# Patient Record
Sex: FEMALE | Race: WHITE | NOT HISPANIC OR LATINO | Employment: FULL TIME | URBAN - METROPOLITAN AREA
[De-identification: names, ages, dates, MRNs, and addresses within clinical notes are randomized per-mention and may not be internally consistent; named-entity substitution may affect disease eponyms.]

---

## 2023-08-31 PROBLEM — R51.9 WORSENING HEADACHES: Status: ACTIVE | Noted: 2023-08-31

## 2023-08-31 PROBLEM — K21.9 GERD (GASTROESOPHAGEAL REFLUX DISEASE): Status: ACTIVE | Noted: 2023-08-31

## 2023-08-31 PROBLEM — R20.0 BILATERAL NUMBNESS AND TINGLING OF ARMS AND LEGS: Status: ACTIVE | Noted: 2023-08-31

## 2023-08-31 PROBLEM — E53.8 VITAMIN B12 DEFICIENCY: Status: ACTIVE | Noted: 2023-08-31

## 2023-08-31 PROBLEM — H91.90 HEARING LOSS: Status: ACTIVE | Noted: 2023-08-31

## 2023-08-31 PROBLEM — N92.6 MISSED MENSES: Status: ACTIVE | Noted: 2023-08-31

## 2023-08-31 PROBLEM — R63.5 WEIGHT GAIN: Status: ACTIVE | Noted: 2023-08-31

## 2023-08-31 PROBLEM — L68.0 HIRSUTISM: Status: ACTIVE | Noted: 2023-08-31

## 2023-08-31 PROBLEM — R20.2 BILATERAL NUMBNESS AND TINGLING OF ARMS AND LEGS: Status: ACTIVE | Noted: 2023-08-31

## 2023-08-31 PROBLEM — M35.7 HYPERMOBILITY SYNDROME: Status: ACTIVE | Noted: 2023-08-31

## 2023-08-31 PROBLEM — G43.719 INTRACTABLE CHRONIC MIGRAINE WITHOUT AURA AND WITHOUT STATUS MIGRAINOSUS: Status: ACTIVE | Noted: 2023-08-31

## 2023-08-31 PROBLEM — M25.561 RIGHT KNEE PAIN: Status: ACTIVE | Noted: 2023-08-31

## 2023-08-31 PROBLEM — F32.2 CURRENT SEVERE EPISODE OF MAJOR DEPRESSIVE DISORDER WITHOUT PSYCHOTIC FEATURES WITHOUT PRIOR EPISODE (MULTI): Status: ACTIVE | Noted: 2023-08-31

## 2023-08-31 PROBLEM — H54.7 VISION LOSS: Status: ACTIVE | Noted: 2023-08-31

## 2023-08-31 PROBLEM — G43.119 INTRACTABLE MIGRAINE WITH AURA WITHOUT STATUS MIGRAINOSUS: Status: ACTIVE | Noted: 2023-08-31

## 2023-08-31 PROBLEM — R10.2 PELVIC PAIN IN FEMALE: Status: ACTIVE | Noted: 2023-08-31

## 2023-08-31 PROBLEM — F41.9 SEVERE ANXIETY: Status: ACTIVE | Noted: 2023-08-31

## 2023-08-31 PROBLEM — N89.8 VAGINAL DISCHARGE: Status: ACTIVE | Noted: 2023-08-31

## 2023-08-31 PROBLEM — R68.89 HEAT INTOLERANCE: Status: ACTIVE | Noted: 2023-08-31

## 2023-08-31 PROBLEM — G47.00 INSOMNIA: Status: ACTIVE | Noted: 2023-08-31

## 2023-08-31 RX ORDER — FREMANEZUMAB-VFRM 225 MG/1.5ML
225 INJECTION SUBCUTANEOUS
COMMUNITY
Start: 2022-10-03 | End: 2023-11-13 | Stop reason: SINTOL

## 2023-08-31 RX ORDER — ALUMINUM CHLORIDE 20 %
SOLUTION, NON-ORAL TOPICAL
COMMUNITY
Start: 2023-04-03 | End: 2024-03-04 | Stop reason: WASHOUT

## 2023-08-31 RX ORDER — ELETRIPTAN HYDROBROMIDE 40 MG/1
TABLET, FILM COATED ORAL
COMMUNITY
Start: 2014-09-09 | End: 2024-03-04 | Stop reason: WASHOUT

## 2023-08-31 RX ORDER — ESCITALOPRAM OXALATE 20 MG/1
20 TABLET ORAL DAILY
COMMUNITY
Start: 2014-10-07 | End: 2024-03-04 | Stop reason: WASHOUT

## 2023-08-31 RX ORDER — ACETAMINOPHEN 325 MG/1
975 TABLET ORAL EVERY 6 HOURS PRN
COMMUNITY
Start: 2020-07-04

## 2023-08-31 RX ORDER — RIZATRIPTAN BENZOATE 10 MG/1
TABLET ORAL
COMMUNITY
Start: 2014-09-12 | End: 2024-03-04 | Stop reason: WASHOUT

## 2023-08-31 RX ORDER — IBUPROFEN 600 MG/1
600 TABLET ORAL EVERY 6 HOURS PRN
COMMUNITY
Start: 2020-07-04

## 2023-08-31 RX ORDER — HYDROXYZINE PAMOATE 25 MG/1
25 CAPSULE ORAL 3 TIMES DAILY PRN
COMMUNITY
Start: 2023-07-31 | End: 2024-03-04 | Stop reason: WASHOUT

## 2023-08-31 RX ORDER — UBROGEPANT 50 MG/1
TABLET ORAL
COMMUNITY
Start: 2022-01-13 | End: 2024-01-02 | Stop reason: SDUPTHER

## 2023-09-04 PROBLEM — E66.811 CLASS 1 OBESITY WITH ALVEOLAR HYPOVENTILATION AND BODY MASS INDEX (BMI) OF 32.0 TO 32.9 IN ADULT: Status: ACTIVE | Noted: 2023-09-04

## 2023-09-04 PROBLEM — E66.2 CLASS 1 OBESITY WITH ALVEOLAR HYPOVENTILATION AND BODY MASS INDEX (BMI) OF 32.0 TO 32.9 IN ADULT (MULTI): Status: ACTIVE | Noted: 2023-09-04

## 2023-09-04 RX ORDER — DULOXETIN HYDROCHLORIDE 30 MG/1
30 CAPSULE, DELAYED RELEASE ORAL DAILY
COMMUNITY
End: 2024-03-04 | Stop reason: WASHOUT

## 2023-09-04 RX ORDER — DULOXETIN HYDROCHLORIDE 60 MG/1
60 CAPSULE, DELAYED RELEASE ORAL DAILY
COMMUNITY
End: 2024-03-04 | Stop reason: WASHOUT

## 2023-11-13 ENCOUNTER — OFFICE VISIT (OUTPATIENT)
Dept: NEUROLOGY | Facility: CLINIC | Age: 28
End: 2023-11-13
Payer: COMMERCIAL

## 2023-11-13 VITALS
BODY MASS INDEX: 33.72 KG/M2 | SYSTOLIC BLOOD PRESSURE: 106 MMHG | HEART RATE: 89 BPM | WEIGHT: 202.4 LBS | RESPIRATION RATE: 16 BRPM | TEMPERATURE: 97.8 F | HEIGHT: 65 IN | DIASTOLIC BLOOD PRESSURE: 70 MMHG

## 2023-11-13 DIAGNOSIS — G43.719 INTRACTABLE CHRONIC MIGRAINE WITHOUT AURA AND WITHOUT STATUS MIGRAINOSUS: Primary | ICD-10-CM

## 2023-11-13 PROCEDURE — 1036F TOBACCO NON-USER: CPT | Performed by: PSYCHIATRY & NEUROLOGY

## 2023-11-13 PROCEDURE — 99214 OFFICE O/P EST MOD 30 MIN: CPT | Performed by: PSYCHIATRY & NEUROLOGY

## 2023-11-13 RX ORDER — ATOGEPANT 60 MG/1
60 TABLET ORAL DAILY
Qty: 30 TABLET | Refills: 3 | Status: SHIPPED | OUTPATIENT
Start: 2023-11-13 | End: 2024-04-26 | Stop reason: SDUPTHER

## 2023-11-13 RX ORDER — LINACLOTIDE 72 UG/1
CAPSULE, GELATIN COATED ORAL
COMMUNITY
Start: 2023-11-10

## 2023-11-13 ASSESSMENT — ENCOUNTER SYMPTOMS
HALLUCINATIONS: 0
FEVER: 0
EYE PAIN: 0
HEADACHES: 0

## 2023-11-13 NOTE — PROGRESS NOTES
Subjective     Janeen Jiméenz is a 28 y.o. year old female here for follow-up of migraines.     Migraine   Pertinent negatives include no ear pain, eye pain or fever.   She was upset she missed her last appointment with me.   Ajovy injections cause bubbly redness for 1-2 days, does not bother her much.  Was feeling burning when she   Ubrelvy has helped the migraine by stopping her migraine when she gets them which she is very happy about.  Ajovy helped a lot in the first year but seems to be not lasting as long as it used to.  Needs Ubrelvy more often.     A few headaches /week as the end of the month approaces.   MRI brain 1-10-22 showed tonsillar ptosis. otherwise unremarkable.  EMG 1-5-22- WNL     Onset of headaches: ever since she remembers  Frequency of headaches (days per month): 10-12 days/ month  Duration of headaches (average): few hours   Severity of headaches (out of 10): 6-7 /10  Aura: visual changes/ colorful floaters. things go black. vision changes for 20 minutes.   Nausea/vomiting: +  Photophobia: +  Phonophobia: +     Location: occipital area, and frontal . pressure like.      stress triggers HAs. hormonal changes as well trigger HAs.      Preventative medications:  Zonisamide - did not help   Topamax- not effective/ caused tingling.   Propranolol 60mg ER- currently on it. BP are slightly low. dizzy.   b complex   Ajovy- currently taking now.      Abortive medications:  Rizatriptan- jaw went numb  Sumatriptan- caused tiredness/ nausea.   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      Personally reviewed with patient- accurate for below - Past Medical, Surgical and Family and Social histories    Last visit was February.  Ajovy has helped her a lot.  Review of Systems   Constitutional:  Negative for fever.   HENT:  Negative for ear pain.    Eyes:  Negative for pain.   Neurological:  Negative for syncope and headaches.         Headache+    Psychiatric/Behavioral:  Negative for hallucinations and self-injury.    All other systems reviewed and are negative.      Patient Active Problem List   Diagnosis    Bilateral numbness and tingling of arms and legs    Current severe episode of major depressive disorder without psychotic features without prior episode (CMS/[facility])    Generalized anxiety disorder    GERD (gastroesophageal reflux disease)    Hearing loss    Heat intolerance    Hirsutism    Hypermobility syndrome    Insomnia    Intractable chronic migraine without aura and without status migrainosus    Intractable migraine with aura without status migrainosus    Migraine    Missed menses    Pelvic pain in female    Right knee pain    Severe anxiety    Vaginal discharge    Vision loss    Vitamin B12 deficiency    Weight gain    Worsening headaches    Class 1 obesity with alveolar hypoventilation and body mass index (BMI) of 32.0 to 32.9 in adult (CMS/[facility])     Past Medical History:   Diagnosis Date    16 weeks gestation of pregnancy 2020    16 weeks gestation of pregnancy    Acute cystitis without hematuria 2020    Acute cystitis without hematuria    Acute vaginitis 2020    Bacterial vaginosis    Encounter for insertion of intrauterine contraceptive device 2020    Encounter for insertion of intrauterine contraceptive device (IUD)    Encounter for other specified  screening 2020    Encounter for fetal anatomic survey    Encounter for routine checking of intrauterine contraceptive device 10/22/2020    Encounter for routine checking of intrauterine contraceptive device (IUD)    Encounter for routine postpartum follow-up 2020    Postpartum care following vaginal delivery    Gestational diabetes mellitus in childbirth, unspecified control 10/28/2020    Gestational diabetes mellitus (GDM), delivered    Impacted cerumen, right ear 2021    Impacted cerumen of right ear    Less than 8 weeks gestation of  pregnancy 11/14/2019    Less than 8 weeks gestation of pregnancy    Other conditions influencing health status 03/05/2020    History of pregnancy    Other sites of candidiasis 07/29/2020    Candidiasis of breast    Pain in right ankle and joints of right foot 08/10/2018    Right ankle pain    Personal history of gestational diabetes     History of gestational diabetes mellitus (GDM)    Personal history of other diseases of the female genital tract 08/18/2020    History of amenorrhea    Personal history of other diseases of the musculoskeletal system and connective tissue 10/28/2020    History of low back pain    Personal history of other diseases of the nervous system and sense organs 04/16/2017    History of otitis media    Personal history of other diseases of the nervous system and sense organs 01/14/2021    History of ear pain    Personal history of other diseases of the nervous system and sense organs 04/02/2021    History of acute otitis media    Personal history of other diseases of the respiratory system 04/11/2017    History of acute pharyngitis    Personal history of other diseases of the respiratory system     History of sore throat    Personal history of other diseases of the respiratory system 04/16/2017    History of acute sinusitis    Personal history of other specified conditions 03/05/2020    History of nausea and vomiting    Postpartum depression 08/25/2020    Mild postpartum depression    Spotting complicating pregnancy, first trimester 12/05/2019    Spotting during pregnancy in first trimester    Sprain of unspecified ligament of right ankle, initial encounter 08/10/2018    Right ankle sprain     Past Surgical History:   Procedure Laterality Date    OTHER SURGICAL HISTORY  11/14/2019    No history of surgery     Social History     Tobacco Use    Smoking status: Never    Smokeless tobacco: Never   Substance Use Topics    Alcohol use: Not Currently     family history includes Diabetes in her  "father; Hyperlipidemia in her father.    Current Outpatient Medications:     aluminum chloride (Drysol Dab-O-Matic) 20 % external solution, Apply to affected area daily at bedtime., Disp: , Rfl:     fremanezumab (Ajovy Autoinjector) 225 mg/1.5 mL auto-injector, Inject 1 Pen (225 mg) under the skin every 30 (thirty) days., Disp: , Rfl:     ibuprofen 600 mg tablet, Take 1 tablet (600 mg) by mouth every 6 hours if needed for moderate pain (4 - 6)., Disp: , Rfl:     Linzess 72 mcg capsule, , Disp: , Rfl:     ubrogepant (Ubrelvy) 50 mg tablet, Take one (1) tablet by mouth Once at start of migraine headache. May repeat dose in 2 hours if needed. Do not exceed maximum of 200mg (4 tablets) per 24 hours., Disp: , Rfl:     acetaminophen (Tylenol) 325 mg tablet, Take 3 tablets (975 mg) by mouth every 6 hours if needed for mild pain (1 - 3)., Disp: , Rfl:     DULoxetine (Cymbalta) 30 mg DR capsule, Take 1 capsule (30 mg) by mouth once daily. Do not crush or chew., Disp: , Rfl:     DULoxetine (Cymbalta) 60 mg DR capsule, Take 1 capsule (60 mg) by mouth once daily. Do not crush or chew., Disp: , Rfl:     eletriptan (Relpax) 40 mg tablet, Take by mouth., Disp: , Rfl:     escitalopram (Lexapro) 20 mg tablet, Take 1 tablet (20 mg) by mouth once daily., Disp: , Rfl:     hydrOXYzine pamoate (Vistaril) 25 mg capsule, Take 1 capsule (25 mg) by mouth 3 times a day as needed for anxiety., Disp: , Rfl:     rizatriptan (Maxalt) 10 mg tablet, , Disp: , Rfl:   Allergies   Allergen Reactions    Topiramate Other     /70 (BP Location: Right arm)   Pulse 89   Temp 36.6 °C (97.8 °F)   Resp 16   Ht 1.651 m (5' 5\")   Wt 91.8 kg (202 lb 6.4 oz)   BMI 33.68 kg/m²   Neurological Exam/Physical Exam:    Constitutional: General appearance: no acute distress. Pleasant.   Auscultation of Heart: Regular rate and rhythm, no murmurs, normal S1 and S2.   Carotid Arteries: no bruits  Peripheral Vascular Exam: No swelling, edema or tenderness to " palpation in extremities  Mental status: no distress, alert, interactive and cooperative. Affect is appropriate.   Orientation: oriented to person, oriented to place and oriented to time.   Memory: recent memory intact and remote memory intact.   Attention: normal attention /concentration.   Language: normal comprehension and naming.   Fund of knowledge: Patient displays adequate knowledge of current events.  Eyes: The ophthalmoscopic examination was normal.   Cranial nerve II: Visual fields full to confrontation.   Cranial nerves III, IV, and VI: Pupils round, equally reactive to light; EOMs intact. No nystagmus.   Cranial Nerve V: Facial sensation intact to LT bilaterally.   Cranial nerve VII: no facial droop  Cranial nerve VIII: Hearing is intact  Cranial nerves IX and X: Palate elevates symmetrically.   Cranial nerve XI: Shoulder shrug intact.   Cranial nerve XII: Tongue is midline.  Motor:  Muscle bulk was normal in both upper and lower extremities.    No atrophy.   Strength is normal   Deep Tendon Reflexes: left biceps 2+ , right biceps 2+, left triceps 2+, right triceps 2+, left brachioradialis 2+, right brachioradialis 2+, left patella 2+, right patella 2+, left ankle jerk 2+, right ankle jerk 2+   Plantar Reflex: Toes downgoing to plantar stimulation on the left. Toes downgoing to plantar stimulation on the right.   Sensory Exam: Normal to light touch.   Coordination:  no limb dystaxia and rapid alternating movements are intact.   Gait:  cautious.         Labs:  CBC:   Lab Results   Component Value Date    WBC 9.4 03/10/2022    HGB 14.0 03/10/2022    HCT 41.9 03/10/2022     03/10/2022     BMP:   Lab Results   Component Value Date     10/18/2021    K 3.9 10/18/2021     10/18/2021    CO2 28 10/18/2021    BUN 12 10/18/2021    CREATININE 0.69 10/18/2021    CALCIUM 9.0 10/18/2021     LFT:   Lab Results   Component Value Date    ALKPHOS 88 10/18/2021    BILITOT 0.6 10/18/2021    PROT 7.5  10/18/2021    ALBUMIN 4.2 10/18/2021    ALT 15 10/18/2021    AST 18 10/18/2021         Assessment/Plan   Problem List Items Addressed This Visit             ICD-10-CM    Intractable chronic migraine without aura and without status migrainosus - Primary G43.719    Switch ajovy to qulipta due to injection site reaction/ not lasting long enough, wearing off effect.

## 2023-11-13 NOTE — PATIENT INSTRUCTIONS
"It was a pleasure seeing you today.     Please make a follow up appointment in 4-6 months.    For any urgent issues or needing to speak to a medical assistant please call [phone], option 6 during our office hours Monday-Friday 8am-4pm, and leave a voicemail with your concern.  My office will try to reach back you as soon as possible within 24 (business) hours.  If you have an emergency please call 911 or visit a local urgent care or nearest emergency room.      Please understand that Seevibes is a useful communication tool for simple \"normal\" results or a refill request but I would not recommend using this tool for emergent or urgent issues or for conversations with me.  I am happy to ask my staff to rearrange a follow up visit or a virtual visit sooner than requested if appropriate for your care.    "

## 2024-01-02 DIAGNOSIS — G43.719 INTRACTABLE CHRONIC MIGRAINE WITHOUT AURA AND WITHOUT STATUS MIGRAINOSUS: Primary | ICD-10-CM

## 2024-01-02 RX ORDER — UBROGEPANT 50 MG/1
100 TABLET ORAL AS NEEDED
Qty: 16 TABLET | Refills: 3 | Status: SHIPPED | OUTPATIENT
Start: 2024-01-02 | End: 2024-01-26

## 2024-01-08 DIAGNOSIS — M25.561 RIGHT KNEE PAIN, UNSPECIFIED CHRONICITY: ICD-10-CM

## 2024-01-10 ENCOUNTER — APPOINTMENT (OUTPATIENT)
Dept: RADIOLOGY | Facility: HOSPITAL | Age: 29
End: 2024-01-10
Payer: COMMERCIAL

## 2024-01-10 ENCOUNTER — OFFICE VISIT (OUTPATIENT)
Dept: ORTHOPEDIC SURGERY | Facility: HOSPITAL | Age: 29
End: 2024-01-10
Payer: COMMERCIAL

## 2024-01-10 DIAGNOSIS — M25.561 PATELLOFEMORAL JOINT PAIN, RIGHT: Primary | ICD-10-CM

## 2024-01-10 PROCEDURE — 99213 OFFICE O/P EST LOW 20 MIN: CPT | Performed by: ORTHOPAEDIC SURGERY

## 2024-01-10 PROCEDURE — 1036F TOBACCO NON-USER: CPT | Performed by: ORTHOPAEDIC SURGERY

## 2024-01-10 PROCEDURE — 99203 OFFICE O/P NEW LOW 30 MIN: CPT | Performed by: ORTHOPAEDIC SURGERY

## 2024-01-10 RX ORDER — FLUTICASONE PROPIONATE 50 MCG
2 SPRAY, SUSPENSION (ML) NASAL DAILY
COMMUNITY

## 2024-01-10 RX ORDER — CETIRIZINE HYDROCHLORIDE 10 MG/1
10 TABLET ORAL DAILY
COMMUNITY
Start: 2023-06-12

## 2024-01-10 RX ORDER — TRAZODONE HYDROCHLORIDE 50 MG/1
TABLET ORAL
COMMUNITY
Start: 2024-01-04 | End: 2024-03-04 | Stop reason: WASHOUT

## 2024-01-10 RX ORDER — MELOXICAM 15 MG/1
TABLET ORAL
COMMUNITY
Start: 2024-01-02

## 2024-01-10 RX ORDER — AZELASTINE 1 MG/ML
2 SPRAY, METERED NASAL 2 TIMES DAILY
COMMUNITY
Start: 2023-12-08

## 2024-01-10 RX ORDER — LORAZEPAM 0.5 MG/1
TABLET ORAL
COMMUNITY
Start: 2020-10-28

## 2024-01-10 ASSESSMENT — PAIN SCALES - GENERAL: PAINLEVEL_OUTOF10: 4

## 2024-01-10 ASSESSMENT — PAIN - FUNCTIONAL ASSESSMENT: PAIN_FUNCTIONAL_ASSESSMENT: 0-10

## 2024-01-10 ASSESSMENT — PAIN DESCRIPTION - DESCRIPTORS: DESCRIPTORS: ACHING;DULL;THROBBING

## 2024-01-10 NOTE — PROGRESS NOTES
HPI  This is a pleasant 28 y.o. female here today for further evaluation of right  knee pain.  The pain started 2 years ago when she felt a pop while riding her peloton. Denies a significant effusion at the time.   It is worse with with stairs and sitting for prolonged time .  It is improved with rest.  They do not feel there has been swelling.  They have had treatment including PT, NSAIDs, and advil .  They are here today for further evaluation.     Past Medical History:   Diagnosis Date    16 weeks gestation of pregnancy 2020    16 weeks gestation of pregnancy    Acute cystitis without hematuria 2020    Acute cystitis without hematuria    Acute vaginitis 2020    Bacterial vaginosis    Encounter for insertion of intrauterine contraceptive device 2020    Encounter for insertion of intrauterine contraceptive device (IUD)    Encounter for other specified  screening 2020    Encounter for fetal anatomic survey    Encounter for routine checking of intrauterine contraceptive device 10/22/2020    Encounter for routine checking of intrauterine contraceptive device (IUD)    Encounter for routine postpartum follow-up 2020    Postpartum care following vaginal delivery    Gestational diabetes mellitus in childbirth, unspecified control 10/28/2020    Gestational diabetes mellitus (GDM), delivered    Impacted cerumen, right ear 2021    Impacted cerumen of right ear    Less than 8 weeks gestation of pregnancy 2019    Less than 8 weeks gestation of pregnancy    Other conditions influencing health status 2020    History of pregnancy    Other sites of candidiasis 2020    Candidiasis of breast    Pain in right ankle and joints of right foot 08/10/2018    Right ankle pain    Personal history of gestational diabetes     History of gestational diabetes mellitus (GDM)    Personal history of other diseases of the female genital tract 2020    History of amenorrhea     Personal history of other diseases of the musculoskeletal system and connective tissue 10/28/2020    History of low back pain    Personal history of other diseases of the nervous system and sense organs 04/16/2017    History of otitis media    Personal history of other diseases of the nervous system and sense organs 01/14/2021    History of ear pain    Personal history of other diseases of the nervous system and sense organs 04/02/2021    History of acute otitis media    Personal history of other diseases of the respiratory system 04/11/2017    History of acute pharyngitis    Personal history of other diseases of the respiratory system     History of sore throat    Personal history of other diseases of the respiratory system 04/16/2017    History of acute sinusitis    Personal history of other specified conditions 03/05/2020    History of nausea and vomiting    Postpartum depression 08/25/2020    Mild postpartum depression    Spotting complicating pregnancy, first trimester 12/05/2019    Spotting during pregnancy in first trimester    Sprain of unspecified ligament of right ankle, initial encounter 08/10/2018    Right ankle sprain       Past Surgical History:   Procedure Laterality Date    OTHER SURGICAL HISTORY  11/14/2019    No history of surgery       Social History     Socioeconomic History    Marital status:      Spouse name: Not on file    Number of children: Not on file    Years of education: Not on file    Highest education level: Not on file   Occupational History    Not on file   Tobacco Use    Smoking status: Never     Passive exposure: Never    Smokeless tobacco: Never   Vaping Use    Vaping Use: Never used   Substance and Sexual Activity    Alcohol use: Not Currently     Comment: 1-2 drinks per year    Drug use: Never    Sexual activity: Defer   Other Topics Concern    Not on file   Social History Narrative    Not on file     Social Determinants of Health     Financial Resource Strain: Not on  file   Food Insecurity: Not on file   Transportation Needs: Not on file   Physical Activity: Not on file   Stress: Not on file   Social Connections: Not on file   Intimate Partner Violence: Not on file   Housing Stability: Not on file           ROS  Reviewed and all pertinent positives mentioned in HPI.      EXAM  Patient is in no acute distress.  They are alert and oriented x3.  They are of normal mood and affect.  They are in no acute distress.  The patient's limb is warm and well-perfused.  They have intact sensation to light touch in all lower extremity dermatomes.  There is a minimal effusion.    The patient's quadriceps and hamstring strength is 5 of 5.    The patient can do a straight leg raise with no radicular pain.  negative lachmans. negative posterior drawer.  There is 1+ patellofemoral crepitus.   The knee is stable to varus and valgus stress at both 0 and 30°.  There is no tenderness along the medial or lateral joint line.  negative McMurrays      IMAGING  Imaging reviewed today reveal no gross fracture or dislocation.  Preserved joint spaces.       ASSESSMENT  right  patella chondromalacia      PLAN  We discussed the diagnosis and natural history of patellar chondromalacia. We recommend conservative treatment in the form of focused PT and Meloxim x7 days. If the pain does not improve with this regimen she will follow-up for further evaluation.

## 2024-01-25 DIAGNOSIS — G43.719 INTRACTABLE CHRONIC MIGRAINE WITHOUT AURA AND WITHOUT STATUS MIGRAINOSUS: ICD-10-CM

## 2024-01-26 RX ORDER — UBROGEPANT 50 MG/1
TABLET ORAL
Qty: 10 TABLET | Refills: 6 | Status: SHIPPED | OUTPATIENT
Start: 2024-01-26 | End: 2024-04-26 | Stop reason: SDUPTHER

## 2024-03-04 ENCOUNTER — OFFICE VISIT (OUTPATIENT)
Dept: NEUROLOGY | Facility: CLINIC | Age: 29
End: 2024-03-04
Payer: COMMERCIAL

## 2024-03-04 VITALS
HEART RATE: 90 BPM | WEIGHT: 192 LBS | SYSTOLIC BLOOD PRESSURE: 119 MMHG | DIASTOLIC BLOOD PRESSURE: 90 MMHG | BODY MASS INDEX: 31.95 KG/M2

## 2024-03-04 DIAGNOSIS — G43.719 INTRACTABLE CHRONIC MIGRAINE WITHOUT AURA AND WITHOUT STATUS MIGRAINOSUS: Primary | ICD-10-CM

## 2024-03-04 PROCEDURE — 1036F TOBACCO NON-USER: CPT | Performed by: PSYCHIATRY & NEUROLOGY

## 2024-03-04 PROCEDURE — 99214 OFFICE O/P EST MOD 30 MIN: CPT | Performed by: PSYCHIATRY & NEUROLOGY

## 2024-03-04 RX ORDER — VENLAFAXINE 37.5 MG/1
37.5 TABLET ORAL 2 TIMES DAILY
COMMUNITY
End: 2024-03-04 | Stop reason: WASHOUT

## 2024-03-04 ASSESSMENT — ENCOUNTER SYMPTOMS
HALLUCINATIONS: 0
EYE PAIN: 0
FEVER: 0
HEADACHES: 0

## 2024-03-04 NOTE — PROGRESS NOTES
Subjective     Janeen Jiménez is a 28 y.o. year old female here for follow-up of migraines.     Ajovy injections cause bubbly redness so it was switched last viist to Qulipta 60mg daily in November.  Less intense headaches, happens every other 1-2 weeks.  Take Ubrelvy and it goes away. She finds these help her migraines a lot.   Same type of headaches, starts in the back of neck and radiates up to the neck.   She drinks water.   She gets 12 tabs of Ubrelvy per month.    Ubrelvy has helped the migraine by stopping her migraine when she gets them which she is very happy about.  MRI brain 1-10-22 showed tonsillar ptosis. otherwise unremarkable.  EMG 1-5-22- WNL     Onset of headaches: ever since she remembers  Frequency of headaches (days per month): 6-8 days/ month  Duration of headaches (average): few hours   Severity of headaches (out of 10): 6-7 /10  Aura: visual changes/ colorful floaters. things go black. vision changes for 20 minutes.   Nausea/vomiting: +  Photophobia: +  Phonophobia: +     Location: occipital area, and frontal . pressure like.      stress triggers HAs. hormonal changes as well trigger HAs.      Preventative medications:  Zonisamide - did not help   Topamax- not effective/ caused tingling.   Propranolol 60mg ER- currently on it. BP are slightly low. dizzy.   b complex   Ajovy- currently taking now.      Abortive medications:  Rizatriptan- jaw went numb  Sumatriptan- caused tiredness/ nausea.   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      Personally reviewed with patient- accurate for below - Past Medical, Surgical and Family and Social histories    Last visit was February.  Ajovy has helped her a lot.  Review of Systems   Constitutional:  Negative for fever.   HENT:  Negative for ear pain.    Eyes:  Negative for pain.   Neurological:  Negative for syncope and headaches.        Headache+    Psychiatric/Behavioral:   Negative for hallucinations and self-injury.    All other systems reviewed and are negative.      Patient Active Problem List   Diagnosis    Bilateral numbness and tingling of arms and legs    Current severe episode of major depressive disorder without psychotic features without prior episode (CMS/[facility])    Generalized anxiety disorder    GERD (gastroesophageal reflux disease)    Hearing loss    Heat intolerance    Hirsutism    Hypermobility syndrome    Insomnia    Intractable chronic migraine without aura and without status migrainosus    Intractable migraine with aura without status migrainosus    Migraine    Missed menses    Pelvic pain in female    Right knee pain    Severe anxiety    Vaginal discharge    Vision loss    Vitamin B12 deficiency    Weight gain    Worsening headaches    Class 1 obesity with alveolar hypoventilation and body mass index (BMI) of 32.0 to 32.9 in adult (CMS/[facility])     Past Medical History:   Diagnosis Date    16 weeks gestation of pregnancy 2020    16 weeks gestation of pregnancy    Acute cystitis without hematuria 2020    Acute cystitis without hematuria    Acute vaginitis 2020    Bacterial vaginosis    Encounter for insertion of intrauterine contraceptive device 2020    Encounter for insertion of intrauterine contraceptive device (IUD)    Encounter for other specified  screening 2020    Encounter for fetal anatomic survey    Encounter for routine checking of intrauterine contraceptive device 10/22/2020    Encounter for routine checking of intrauterine contraceptive device (IUD)    Encounter for routine postpartum follow-up 2020    Postpartum care following vaginal delivery    Gestational diabetes mellitus in childbirth, unspecified control 10/28/2020    Gestational diabetes mellitus (GDM), delivered    Impacted cerumen, right ear 2021    Impacted cerumen of right ear    Less than 8 weeks gestation of pregnancy 2019    Less than 8  weeks gestation of pregnancy    Other conditions influencing health status 03/05/2020    History of pregnancy    Other sites of candidiasis 07/29/2020    Candidiasis of breast    Pain in right ankle and joints of right foot 08/10/2018    Right ankle pain    Personal history of gestational diabetes     History of gestational diabetes mellitus (GDM)    Personal history of other diseases of the female genital tract 08/18/2020    History of amenorrhea    Personal history of other diseases of the musculoskeletal system and connective tissue 10/28/2020    History of low back pain    Personal history of other diseases of the nervous system and sense organs 04/16/2017    History of otitis media    Personal history of other diseases of the nervous system and sense organs 01/14/2021    History of ear pain    Personal history of other diseases of the nervous system and sense organs 04/02/2021    History of acute otitis media    Personal history of other diseases of the respiratory system 04/11/2017    History of acute pharyngitis    Personal history of other diseases of the respiratory system     History of sore throat    Personal history of other diseases of the respiratory system 04/16/2017    History of acute sinusitis    Personal history of other specified conditions 03/05/2020    History of nausea and vomiting    Postpartum depression 08/25/2020    Mild postpartum depression    Spotting complicating pregnancy, first trimester 12/05/2019    Spotting during pregnancy in first trimester    Sprain of unspecified ligament of right ankle, initial encounter 08/10/2018    Right ankle sprain     Past Surgical History:   Procedure Laterality Date    OTHER SURGICAL HISTORY  11/14/2019    No history of surgery     Social History     Tobacco Use    Smoking status: Never     Passive exposure: Never    Smokeless tobacco: Never   Substance Use Topics    Alcohol use: Not Currently     Comment: 1-2 drinks per year     family history  includes Diabetes in her father; Hyperlipidemia in her father; aunt with kidney disease and possible colon cancer in an other family member.    Current Outpatient Medications:     acetaminophen (Tylenol) 325 mg tablet, Take 3 tablets (975 mg) by mouth every 6 hours if needed for mild pain (1 - 3)., Disp: , Rfl:     aluminum chloride (Drysol Dab-O-Matic) 20 % external solution, Apply to affected area daily at bedtime., Disp: , Rfl:     atogepant (Qulipta) 60 mg tablet tablet, Take 1 tablet (60 mg) by mouth once daily., Disp: 30 tablet, Rfl: 3    azelastine (Astelin) 137 mcg (0.1 %) nasal spray, Administer 2 sprays into each nostril 2 times a day., Disp: , Rfl:     cetirizine (ZyrTEC) 10 mg tablet, Take 1 tablet (10 mg) by mouth once daily., Disp: , Rfl:     DULoxetine (Cymbalta) 30 mg DR capsule, Take 1 capsule (30 mg) by mouth once daily. Do not crush or chew., Disp: , Rfl:     DULoxetine (Cymbalta) 60 mg DR capsule, Take 1 capsule (60 mg) by mouth once daily. Do not crush or chew., Disp: , Rfl:     eletriptan (Relpax) 40 mg tablet, Take by mouth., Disp: , Rfl:     escitalopram (Lexapro) 20 mg tablet, Take 1 tablet (20 mg) by mouth once daily., Disp: , Rfl:     fluticasone (Flonase) 50 mcg/actuation nasal spray, Administer 2 sprays into each nostril once daily., Disp: , Rfl:     hydrOXYzine pamoate (Vistaril) 25 mg capsule, Take 1 capsule (25 mg) by mouth 3 times a day as needed for anxiety., Disp: , Rfl:     ibuprofen 600 mg tablet, Take 1 tablet (600 mg) by mouth every 6 hours if needed for moderate pain (4 - 6)., Disp: , Rfl:     Linzess 72 mcg capsule, , Disp: , Rfl:     LORazepam (Ativan) 0.5 mg tablet, Take by mouth., Disp: , Rfl:     meloxicam (Mobic) 15 mg tablet, , Disp: , Rfl:     rizatriptan (Maxalt) 10 mg tablet, , Disp: , Rfl:     traZODone (Desyrel) 50 mg tablet, , Disp: , Rfl:     Ubrelvy 50 mg tablet, TAKE 1 TAB AT START OF MIGRAINE HEADACHE, Disp: 10 tablet, Rfl: 6    venlafaxine (Effexor) 37.5 mg  tablet, Take 1 tablet (37.5 mg) by mouth 2 times a day., Disp: , Rfl:   Allergies   Allergen Reactions    Topiramate Other     Caused hand and feet numbness     /90   Pulse 90   Wt 87.1 kg (192 lb)   BMI 31.95 kg/m²   Neurological Exam/Physical Exam:    Constitutional: General appearance: no acute distress. Pleasant.   Auscultation of Heart: Regular rate and rhythm, no murmurs, normal S1 and S2.   Carotid Arteries: no bruits  Peripheral Vascular Exam: No swelling, edema or tenderness to palpation in extremities  Mental status: no distress, alert, interactive and cooperative. Affect is appropriate.   Orientation: oriented to person, oriented to place and oriented to time.   Memory: recent memory intact and remote memory intact.   Attention: normal attention /concentration.   Language: normal comprehension and naming.   Fund of knowledge: Patient displays adequate knowledge of current events.  Eyes: The ophthalmoscopic examination was normal.   Cranial nerve II: Visual fields full to confrontation.   Cranial nerves III, IV, and VI: Pupils round, equally reactive to light; EOMs intact. No nystagmus.   Cranial Nerve V: Facial sensation intact to LT bilaterally.   Cranial nerve VII: no facial droop  Cranial nerve VIII: Hearing is intact  Cranial nerves IX and X: Palate elevates symmetrically.   Cranial nerve XI: Shoulder shrug intact.   Cranial nerve XII: Tongue is midline.  Motor:  Muscle bulk was normal in both upper and lower extremities.    No atrophy.   Strength is normal   Deep Tendon Reflexes: left biceps 2+ , right biceps 2+, left triceps 2+, right triceps 2+, left brachioradialis 2+, right brachioradialis 2+, left patella 2+, right patella 2+, left ankle jerk 2+, right ankle jerk 2+   Plantar Reflex: Toes downgoing to plantar stimulation on the left. Toes downgoing to plantar stimulation on the right.   Sensory Exam: Normal to light touch.   Coordination:  no limb dystaxia and rapid alternating  movements are intact.   Gait:  cautious.         Labs:  CBC:   Lab Results   Component Value Date    WBC 9.4 03/10/2022    HGB 14.0 03/10/2022    HCT 41.9 03/10/2022     03/10/2022     BMP:   Lab Results   Component Value Date     10/18/2021    K 3.9 10/18/2021     10/18/2021    CO2 28 10/18/2021    BUN 12 10/18/2021    CREATININE 0.69 10/18/2021    CALCIUM 9.0 10/18/2021     LFT:   Lab Results   Component Value Date    ALKPHOS 88 10/18/2021    BILITOT 0.6 10/18/2021    PROT 7.5 10/18/2021    ALBUMIN 4.2 10/18/2021    ALT 15 10/18/2021    AST 18 10/18/2021         Assessment/Plan   Problem List Items Addressed This Visit             ICD-10-CM    Intractable chronic migraine without aura and without status migrainosus - Primary G43.719     Qulipta helping a lot.  Ubrelvy helping for abortive dramatically.

## 2024-03-04 NOTE — PATIENT INSTRUCTIONS
"It was a pleasure seeing you today.     There are good alternative medication and over-the-counter strategies to help migraines:     You can try riboflavin (vitamin B2) 200 mg twice a day as a natural migraine preventive. This may take several weeks to become fully effective.  You can try magnesium 400 mg once daily as a natural migraine preventive. If it causes loose bowel movements, reduce the dose to 200 mg.    For any urgent issues or needing to speak to a medical assistant please call [phone], option 6 during our office hours Monday-Friday 8am-4pm, and leave a voicemail with your concern.  My office will try to reach back you as soon as possible within 24 (business) hours.  If you have an emergency please call 911 or visit a local urgent care or nearest emergency room.      Please understand that [email or web address] is a useful communication tool for simple \"normal\" results or a refill request but I would not recommend using this tool for emergent or urgent issues or for conversations with me.  I am happy to ask my staff to rearrange a follow up visit or a virtual visit sooner than requested if appropriate for your care.    "

## 2024-04-26 DIAGNOSIS — G43.719 INTRACTABLE CHRONIC MIGRAINE WITHOUT AURA AND WITHOUT STATUS MIGRAINOSUS: ICD-10-CM

## 2024-04-26 RX ORDER — ATOGEPANT 60 MG/1
60 TABLET ORAL DAILY
Qty: 30 TABLET | Refills: 3 | Status: SHIPPED | OUTPATIENT
Start: 2024-04-26

## 2024-04-26 RX ORDER — UBROGEPANT 50 MG/1
100 TABLET ORAL DAILY
Qty: 10 TABLET | Refills: 6 | Status: SHIPPED | OUTPATIENT
Start: 2024-04-26

## 2024-07-08 ENCOUNTER — APPOINTMENT (OUTPATIENT)
Dept: OBSTETRICS AND GYNECOLOGY | Facility: CLINIC | Age: 29
End: 2024-07-08
Payer: COMMERCIAL

## 2024-07-22 ENCOUNTER — APPOINTMENT (OUTPATIENT)
Dept: OBSTETRICS AND GYNECOLOGY | Facility: CLINIC | Age: 29
End: 2024-07-22
Payer: COMMERCIAL

## 2024-09-09 ENCOUNTER — APPOINTMENT (OUTPATIENT)
Dept: NEUROLOGY | Facility: CLINIC | Age: 29
End: 2024-09-09
Payer: COMMERCIAL

## 2024-09-09 VITALS
BODY MASS INDEX: 32.69 KG/M2 | DIASTOLIC BLOOD PRESSURE: 90 MMHG | HEIGHT: 65 IN | HEART RATE: 86 BPM | SYSTOLIC BLOOD PRESSURE: 121 MMHG | WEIGHT: 196.21 LBS

## 2024-09-09 DIAGNOSIS — G43.719 INTRACTABLE CHRONIC MIGRAINE WITHOUT AURA AND WITHOUT STATUS MIGRAINOSUS: Primary | ICD-10-CM

## 2024-09-09 PROCEDURE — 1036F TOBACCO NON-USER: CPT | Performed by: PSYCHIATRY & NEUROLOGY

## 2024-09-09 PROCEDURE — 3008F BODY MASS INDEX DOCD: CPT | Performed by: PSYCHIATRY & NEUROLOGY

## 2024-09-09 PROCEDURE — 99214 OFFICE O/P EST MOD 30 MIN: CPT | Performed by: PSYCHIATRY & NEUROLOGY

## 2024-09-09 RX ORDER — ATOGEPANT 60 MG/1
60 TABLET ORAL DAILY
Qty: 30 TABLET | Refills: 3 | Status: SHIPPED | OUTPATIENT
Start: 2024-09-09

## 2024-09-09 ASSESSMENT — ENCOUNTER SYMPTOMS
EYE PAIN: 0
HALLUCINATIONS: 0
HEADACHES: 0
FEVER: 0

## 2024-09-09 NOTE — PROGRESS NOTES
Subjective     Janeen Quiroz is a 29 y.o. year old female here for follow-up of migraines.     She has more stress induced headaches. Her anxiety is worsened.   Ajovy injections cause bubbly redness so it was switched last viist to Qulipta 60mg daily in November.  Less intense headaches, happens every other 1-2 weeks.  Take Ubrelvy and it goes away. She finds these help her migraines a lot.   Same type of headaches, starts in the back of neck and radiates up to the neck.   She drinks water.   She gets 12 tabs of Ubrelvy per month.    Ubrelvy has helped the migraine by stopping her migraine when she gets them which she is very happy about.  MRI brain 1-10-22 showed tonsillar ptosis. otherwise unremarkable.  EMG 1-5-22- WNL     Onset of headaches: ever since she remembers  Frequency of headaches (days per month): 7 days/ month  Duration of headaches (average): few hours   Severity of headaches (out of 10): 6-7 /10  Aura: visual changes/ colorful floaters. things go black. vision changes for 20 minutes.  Tends to start in her neck.   Nausea/vomiting: +  Photophobia: +  Phonophobia: +     Location: occipital area, and frontal . pressure like.      stress triggers HAs. hormonal changes as well trigger HAs.      Preventative medications:  Zonisamide - did not help   Topamax- not effective/ caused tingling.   Propranolol 60mg ER- currently on it. BP are slightly low. dizzy.   b complex   Ajovy- currently taking now.      Abortive medications:  Rizatriptan- jaw went numb  Sumatriptan- caused tiredness/ nausea.   Nurtec- mild benefit   Zofran  Fiorecet  Ubrelvy - helping her alot     SH: has 2 children.  Works as a patient coordinator.      Personally reviewed with patient- accurate for below - Past Medical, Surgical and Family and Social histories    Last visit was February.  Sbovy has helped her a lot.  Review of Systems   Constitutional:  Negative for fever.   HENT:  Negative for ear pain.    Eyes:  Negative for  pain.   Neurological:  Negative for syncope and headaches.        Headache+    Psychiatric/Behavioral:  Negative for hallucinations and self-injury.    All other systems reviewed and are negative.      Patient Active Problem List   Diagnosis    Bilateral numbness and tingling of arms and legs    Current severe episode of major depressive disorder without psychotic features without prior episode ([facility])    Generalized anxiety disorder    GERD (gastroesophageal reflux disease)    Hearing loss    Heat intolerance    Hirsutism    Hypermobility syndrome    Insomnia    Intractable chronic migraine without aura and without status migrainosus    Intractable migraine with aura without status migrainosus    Migraine    Missed menses    Pelvic pain in female    Right knee pain    Severe anxiety    Vaginal discharge    Vision loss    Vitamin B12 deficiency    Weight gain    Worsening headaches    Class 1 obesity with alveolar hypoventilation and body mass index (BMI) of 32.0 to 32.9 in adult ([facility])     Past Medical History:   Diagnosis Date    16 weeks gestation of pregnancy ([facility]) 2020    16 weeks gestation of pregnancy    Acute cystitis without hematuria 2020    Acute cystitis without hematuria    Acute vaginitis 2020    Bacterial vaginosis    Encounter for insertion of intrauterine contraceptive device 2020    Encounter for insertion of intrauterine contraceptive device (IUD)    Encounter for other specified  screening ([facility]) 2020    Encounter for fetal anatomic survey    Encounter for routine checking of intrauterine contraceptive device 10/22/2020    Encounter for routine checking of intrauterine contraceptive device (IUD)    Encounter for routine postpartum follow-up ([facility]) 2020    Postpartum care following vaginal delivery    Gestational diabetes mellitus in childbirth, unspecified control ([facility]) 10/28/2020    Gestational diabetes mellitus (GDM), delivered     Impacted cerumen, right ear 01/11/2021    Impacted cerumen of right ear    Less than 8 weeks gestation of pregnancy ([facility]) 11/14/2019    Less than 8 weeks gestation of pregnancy    Other conditions influencing health status 03/05/2020    History of pregnancy    Other sites of candidiasis 07/29/2020    Candidiasis of breast    Pain in right ankle and joints of right foot 08/10/2018    Right ankle pain    Personal history of gestational diabetes     History of gestational diabetes mellitus (GDM)    Personal history of other diseases of the female genital tract 08/18/2020    History of amenorrhea    Personal history of other diseases of the musculoskeletal system and connective tissue 10/28/2020    History of low back pain    Personal history of other diseases of the nervous system and sense organs 04/16/2017    History of otitis media    Personal history of other diseases of the nervous system and sense organs 01/14/2021    History of ear pain    Personal history of other diseases of the nervous system and sense organs 04/02/2021    History of acute otitis media    Personal history of other diseases of the respiratory system 04/11/2017    History of acute pharyngitis    Personal history of other diseases of the respiratory system     History of sore throat    Personal history of other diseases of the respiratory system 04/16/2017    History of acute sinusitis    Personal history of other specified conditions 03/05/2020    History of nausea and vomiting    Postpartum depression 08/25/2020    Mild postpartum depression    Spotting complicating pregnancy, first trimester ([facility]) 12/05/2019    Spotting during pregnancy in first trimester    Sprain of unspecified ligament of right ankle, initial encounter 08/10/2018    Right ankle sprain     Past Surgical History:   Procedure Laterality Date    OTHER SURGICAL HISTORY  11/14/2019    No history of surgery     Social History     Tobacco Use    Smoking status: Never      Passive exposure: Never    Smokeless tobacco: Never   Substance Use Topics    Alcohol use: Not Currently     Comment: 1-2 drinks per year     family history includes Diabetes in her father; Hyperlipidemia in her father; aunt with kidney disease and possible colon cancer in an other family member.    Current Outpatient Medications:     acetaminophen (Tylenol) 325 mg tablet, Take 3 tablets (975 mg) by mouth every 6 hours if needed for mild pain (1 - 3)., Disp: , Rfl:     atogepant (Qulipta) 60 mg tablet tablet, Take 1 tablet (60 mg) by mouth once daily., Disp: 30 tablet, Rfl: 3    azelastine (Astelin) 137 mcg (0.1 %) nasal spray, Administer 2 sprays into each nostril 2 times a day., Disp: , Rfl:     cetirizine (ZyrTEC) 10 mg tablet, Take 1 tablet (10 mg) by mouth once daily., Disp: , Rfl:     fluticasone (Flonase) 50 mcg/actuation nasal spray, Administer 2 sprays into each nostril once daily., Disp: , Rfl:     ibuprofen 600 mg tablet, Take 1 tablet (600 mg) by mouth every 6 hours if needed for moderate pain (4 - 6)., Disp: , Rfl:     Linzess 72 mcg capsule, , Disp: , Rfl:     LORazepam (Ativan) 0.5 mg tablet, Take by mouth., Disp: , Rfl:     meloxicam (Mobic) 15 mg tablet, , Disp: , Rfl:     ubrogepant (Ubrelvy) 50 mg tablet, Take 2 tablets (100 mg) by mouth once daily., Disp: 10 tablet, Rfl: 6  Allergies   Allergen Reactions    Topiramate Other     Caused hand and feet numbness     There were no vitals taken for this visit.  Neurological Exam/Physical Exam:    Constitutional: General appearance: no acute distress. Pleasant.   Auscultation of Heart: Regular rate and rhythm, no murmurs, normal S1 and S2.   Carotid Arteries: no bruits  Peripheral Vascular Exam: No swelling, edema or tenderness to palpation in extremities  Mental status: no distress, alert, interactive and cooperative. Affect is appropriate.   Orientation: oriented to person, oriented to place and oriented to time.   Attention: normal attention  /concentration.   Language: normal comprehension and naming.   Eyes: The ophthalmoscopic examination was normal.   Cranial nerve II: Visual fields full to confrontation.   Cranial nerves III, IV, and VI: Pupils round, equally reactive to light; EOMs intact. No nystagmus.   Cranial Nerve V: Facial sensation intact to LT bilaterally.   Cranial nerve VII: no facial droop  Cranial nerve VIII: Hearing is intact  Cranial nerves IX and X: Palate elevates symmetrically.   Cranial nerve XI: Shoulder shrug intact.   Cranial nerve XII: Tongue is midline.  Motor:  Muscle bulk was normal in both upper and lower extremities.    No atrophy.   Strength is normal   Deep Tendon Reflexes: left biceps 2+ , right biceps 2+, left triceps 2+, right triceps 2+, left brachioradialis 2+, right brachioradialis 2+, left patella 2+, right patella 2+, left ankle jerk 2+, right ankle jerk 2+   Plantar Reflex: Toes downgoing to plantar stimulation on the left. Toes downgoing to plantar stimulation on the right.   Sensory Exam: Normal to light touch.   Coordination:  no limb dystaxia   Gait:  cautious.         Labs:  CBC:   Lab Results   Component Value Date    WBC 9.4 03/10/2022    HGB 14.0 03/10/2022    HCT 41.9 03/10/2022     03/10/2022     BMP:   Lab Results   Component Value Date     10/18/2021    K 3.9 10/18/2021     10/18/2021    CO2 28 10/18/2021    BUN 12 10/18/2021    CREATININE 0.69 10/18/2021    CALCIUM 9.0 10/18/2021     LFT:   Lab Results   Component Value Date    ALKPHOS 88 10/18/2021    BILITOT 0.6 10/18/2021    PROT 7.5 10/18/2021    ALBUMIN 4.2 10/18/2021    ALT 15 10/18/2021    AST 18 10/18/2021         Assessment/Plan   Problem List Items Addressed This Visit    None      Qulipta helping a lot.  Ubrelvy helping for abortive dramatically.

## 2024-09-09 NOTE — PATIENT INSTRUCTIONS
"It was a pleasure seeing you today.     Please make a follow up appointment in 6 months.  You may also schedule a phone or virtual visit sooner on a Friday morning with me as needed before the next clinic appointment.     For any urgent issues or needing to speak to a medical assistant please call [phone], option 6 during our office hours Monday-Friday 8am-4pm, and leave a voicemail with your concern.  My office will try to reach back you as soon as possible within 24 (business) hours.  If you have an emergency please call 911 or visit a local urgent care or nearest emergency room.      Please understand that [employer] is a useful communication tool for simple \"normal\" results or a refill request but I would not recommend using this tool for emergent or urgent issues or for conversations with me.  I am happy to ask my staff to rearrange a follow up visit or a virtual visit sooner than requested if appropriate for your care.    "

## 2024-10-14 ENCOUNTER — APPOINTMENT (OUTPATIENT)
Dept: OBSTETRICS AND GYNECOLOGY | Facility: CLINIC | Age: 29
End: 2024-10-14
Payer: COMMERCIAL

## 2024-10-14 VITALS
HEIGHT: 65 IN | DIASTOLIC BLOOD PRESSURE: 64 MMHG | BODY MASS INDEX: 32.99 KG/M2 | WEIGHT: 198 LBS | SYSTOLIC BLOOD PRESSURE: 104 MMHG

## 2024-10-14 DIAGNOSIS — Z11.51 SCREENING FOR HPV (HUMAN PAPILLOMAVIRUS): ICD-10-CM

## 2024-10-14 DIAGNOSIS — G43.811 OTHER MIGRAINE WITH STATUS MIGRAINOSUS, INTRACTABLE: ICD-10-CM

## 2024-10-14 DIAGNOSIS — Z80.3 FAMILY HISTORY OF BREAST CANCER: ICD-10-CM

## 2024-10-14 DIAGNOSIS — N92.0 MENORRHAGIA WITH REGULAR CYCLE: ICD-10-CM

## 2024-10-14 DIAGNOSIS — Z12.4 CERVICAL CANCER SCREENING: ICD-10-CM

## 2024-10-14 DIAGNOSIS — N94.6 DYSMENORRHEA: ICD-10-CM

## 2024-10-14 DIAGNOSIS — Z01.419 WELL WOMAN EXAM WITH ROUTINE GYNECOLOGICAL EXAM: Primary | ICD-10-CM

## 2024-10-14 PROCEDURE — 3008F BODY MASS INDEX DOCD: CPT | Performed by: NURSE PRACTITIONER

## 2024-10-14 PROCEDURE — 87624 HPV HI-RISK TYP POOLED RSLT: CPT

## 2024-10-14 PROCEDURE — 99395 PREV VISIT EST AGE 18-39: CPT | Performed by: NURSE PRACTITIONER

## 2024-10-14 PROCEDURE — 88175 CYTOPATH C/V AUTO FLUID REDO: CPT

## 2024-10-14 PROCEDURE — 1036F TOBACCO NON-USER: CPT | Performed by: NURSE PRACTITIONER

## 2024-10-14 RX ORDER — MECLIZINE HYDROCHLORIDE 25 MG/1
25 TABLET ORAL EVERY 8 HOURS PRN
COMMUNITY
Start: 2024-10-02

## 2024-10-14 RX ORDER — DULOXETIN HYDROCHLORIDE 60 MG/1
CAPSULE, DELAYED RELEASE ORAL
COMMUNITY
Start: 2024-09-25

## 2024-10-14 RX ORDER — TIZANIDINE 2 MG/1
TABLET ORAL
COMMUNITY
Start: 2024-10-09

## 2024-10-14 NOTE — PROGRESS NOTES
WNL pap , no HPV testing      Subjective   Janeen Quiroz is a 29 y.o. female who is here for a routine exam. Periods are regular every 28-30 days, lasting 6 days. At the heaviest soaking super tampons every 1 hour. Dysmenorrhea:moderate, occurring premenstrually and first 1-2 days of flow. Cyclic symptoms include none. No intermenstrual bleeding, spotting, or discharge.  Menorrhagia was evaluated with a transvaginal ultrasound that showed enlarged uterus to 9 cm however no other concern.  Patient is desiring to restart an IUD for control of heavy bleeding and dysmenorrhea.    Thyroid labs and thyroid ultrasound up-to-date and patient following with her PCP in regards to family history of thyroid disease.    Current contraception: IUD placed in , removed, no current contraception.     History of abnormal Pap smear: no  Family history of uterine or ovarian cancer: no  Regular self breast exam: no    History of abnormal mammogram: no  Family history of breast cancer: yes - maternal and paternal breast cancer    Last pap:  WNL    Review of Systems:    Constitutional: Negative.    HENT: Negative.     Eyes: Negative.    Respiratory: Negative.     Cardiovascular: Negative.    Gastrointestinal: Negative.    Endocrine: Negative.    Genitourinary: Negative.    Musculoskeletal: Negative.    Skin: Negative.    Allergic/Immunologic: Negative.    Neurological: Negative.    Hematological: Negative.    Psychiatric/Behavioral: Negative.       Past Medical History:   Diagnosis Date    16 weeks gestation of pregnancy ([facility]) 2020    16 weeks gestation of pregnancy    Acute cystitis without hematuria 2020    Acute cystitis without hematuria    Acute vaginitis 2020    Bacterial vaginosis    Encounter for insertion of intrauterine contraceptive device 2020    Encounter for insertion of intrauterine contraceptive device (IUD)    Encounter for other specified  screening 2020     Encounter for fetal anatomic survey    Encounter for routine checking of intrauterine contraceptive device 10/22/2020    Encounter for routine checking of intrauterine contraceptive device (IUD)    Encounter for routine postpartum follow-up 08/18/2020    Postpartum care following vaginal delivery    Gestational diabetes mellitus in childbirth, unspecified control ([facility]) 10/28/2020    Gestational diabetes mellitus (GDM), delivered    Impacted cerumen, right ear 01/11/2021    Impacted cerumen of right ear    Less than 8 weeks gestation of pregnancy ([facility]) 11/14/2019    Less than 8 weeks gestation of pregnancy    Other conditions influencing health status 03/05/2020    History of pregnancy    Other sites of candidiasis 07/29/2020    Candidiasis of breast    Pain in right ankle and joints of right foot 08/10/2018    Right ankle pain    Personal history of gestational diabetes     History of gestational diabetes mellitus (GDM)    Personal history of other diseases of the female genital tract 08/18/2020    History of amenorrhea    Personal history of other diseases of the musculoskeletal system and connective tissue 10/28/2020    History of low back pain    Personal history of other diseases of the nervous system and sense organs 04/16/2017    History of otitis media    Personal history of other diseases of the nervous system and sense organs 01/14/2021    History of ear pain    Personal history of other diseases of the nervous system and sense organs 04/02/2021    History of acute otitis media    Personal history of other diseases of the respiratory system 04/11/2017    History of acute pharyngitis    Personal history of other diseases of the respiratory system     History of sore throat    Personal history of other diseases of the respiratory system 04/16/2017    History of acute sinusitis    Personal history of other specified conditions 03/05/2020    History of nausea and vomiting    Postpartum depression  "2020    Mild postpartum depression    Spotting complicating pregnancy, first trimester ([facility]) 2019    Spotting during pregnancy in first trimester    Sprain of unspecified ligament of right ankle, initial encounter 08/10/2018    Right ankle sprain      Past Surgical History:   Procedure Laterality Date    OTHER SURGICAL HISTORY  2019    No history of surgery      Menstrual History:  OB History          2    Para        Term                AB        Living   2         SAB        IAB        Ectopic        Multiple        Live Births   2                Patient's last menstrual period was 10/02/2024 (exact date).         Review of Systems    Objective   /64   Ht 1.651 m (5' 5\")   Wt 89.8 kg (198 lb)   LMP 10/02/2024 (Exact Date)   BMI 32.95 kg/m²     Exam:   Constitutional; alert with no acute distress.  Well-nourished.      Neck: No asymmetry noted.  Thyroid without enlargement.  No palpable nodules or masses of concern.    Cardiovascular: Heart regular rate and rhythm, normal S1 and S2    Pulmonary: No respiratory distress, lungs clear to auscultate bilaterally    Chest/breast exam: Appearance bilaterally normal, without asymmetry of concern, without skin lesions or nipple discharge.  Palpation of the breast-no palpable masses no lymphadenopathy of the axilla.    Abdomen: Soft, nontender, no abdominal masses palpated    Genitourinary:  Palpation of the lymph nodes of the groin-no inguinal lymphadenopathy  External genitalia/perianal: Without lesions normal in appearance   Urethra: In appearance without lesions Bladder: non-tender to palpate  Vagina: Without lesions including Bartholin, urethra, Chicken's glands within normal limits all WNL   Cervix: Normal in appearance without lesions, no cervical motion tenderness to palpation  Uterus: Without enlargement, mobile, nontender to palpate  Bilateral adnexa:  without masses, nontender to palpate    Skin: Normal skin color and " pigmentation    Psychiatric: Alert and oriented x3. Affect normal to patient baseline.  Mood: appropriate       Assessment/Plan   Diagnoses and all orders for this visit:  Well woman exam with routine gynecological exam  -     THINPREP PAP TEST (>30)  Other migraine with status migrainosus, intractable  Family history of breast cancer  Cervical cancer screening  -     THINPREP PAP TEST (>30)  Menorrhagia with regular cycle  Screening for HPV (human papillomavirus)  -     THINPREP PAP TEST (>30)      No follow-ups on file.     Pap plan: WNL HX, plan co-testing every 5 years   STI screening annually and prn if needed  Contraception discussed  RTO 1 year annual exam, prn   Mammogram screening evaluation     RTO 2 weeks for IUD insertion, advised to use condoms and pt will RTO with motrin on boards     R/B reviewed of IUD. Pamphlet given to the pt.     Karina Thomason, APRN-CNM, APRN-CNP

## 2024-11-05 ENCOUNTER — APPOINTMENT (OUTPATIENT)
Dept: OBSTETRICS AND GYNECOLOGY | Facility: CLINIC | Age: 29
End: 2024-11-05
Payer: COMMERCIAL

## 2024-11-05 VITALS — WEIGHT: 200 LBS | SYSTOLIC BLOOD PRESSURE: 102 MMHG | DIASTOLIC BLOOD PRESSURE: 80 MMHG | BODY MASS INDEX: 33.28 KG/M2

## 2024-11-05 DIAGNOSIS — Z30.430 ENCOUNTER FOR IUD INSERTION: Primary | ICD-10-CM

## 2024-11-05 DIAGNOSIS — Z32.02 PREGNANCY TEST NEGATIVE: ICD-10-CM

## 2024-11-05 LAB — PREGNANCY TEST URINE, POC: NEGATIVE

## 2024-11-05 PROCEDURE — 81025 URINE PREGNANCY TEST: CPT | Performed by: NURSE PRACTITIONER

## 2024-11-05 PROCEDURE — 58300 INSERT INTRAUTERINE DEVICE: CPT | Performed by: NURSE PRACTITIONER

## 2024-11-05 NOTE — PROGRESS NOTES
IUD Insertion Procedure Note    Indications: contraception and abnormal bleeding    Procedure Details   Urine pregnancy test was done completed and result was negative .  The risks (including infection, bleeding, pain, and uterine perforation) and benefits of the procedure were explained to the patient and Written informed consent was obtained.      Procedure: Mirena (IUD) placement  Cervix cleansed with Betadine. Uterus sounded to 9 cm.   Local anesthesia:  None  Tenaculum used:  Yes, single tooth, anterior  IUD inserted without difficulty.   String visible and trimmed to 2-3cm.  Patient tolerated procedure well.    Condition:  Stable      Complications:  None    Jaenen was seen today for procedure.  Diagnoses and all orders for this visit:  Encounter for IUD insertion (Primary)  -     levonorgestrel (Mirena) 20 mcg/24hr IUD  Pregnancy test negative  -     POCT pregnancy, urine manually resulted     Plan:  The patient was advised to call for any fever or for prolonged or severe pain or bleeding. She was advised to use NSAID as needed for mild to moderate pain.       Assessment/Plan      Karina Thomason, APRN-CNM, APRN-CNP

## 2024-12-19 DIAGNOSIS — G43.719 INTRACTABLE CHRONIC MIGRAINE WITHOUT AURA AND WITHOUT STATUS MIGRAINOSUS: ICD-10-CM

## 2025-01-13 NOTE — PROGRESS NOTES
"    Subjective   Janeen Quiroz is a 29 y.o.  who presents for IUD check.    Type of IUD:  Mirena  Date of insertion:  known 2025  New onset of pain, but having irregular bleeding. Pt states occasional heavy bleeding. Pt states she is still saturating a tampon an hour.   Other relevant history/information:  none  Mild irregular spotting that has resolved.   Denies new exposure to STIs and declines STI testing at today's visit.    Desiring IUD removal.     Objective   /86   Ht 1.651 m (5' 5\")   Wt 88 kg (194 lb)   BMI 32.28 kg/m²     Constitutional; alert with no acute distress.  Well-nourished.      Abdomen: Soft, nontender, no abdominal masses palpated    Genitourinary:  Palpation of the lymph nodes of the groin-no inguinal lymphadenopathy  External genitalia/perianal: Without lesions normal in appearance   Urethra: In appearance without lesions Bladder: non-tender to palpate  Vagina: Without lesions including Bartholin, urethra, [place]'s glands within normal limits all WNL   Cervix: Normal in appearance without lesions, no cervical motion tenderness to palpation, IUD STRINGS VISUALIZED 2  cm from opening of cervix   Uterus: deferred  Bilateral adnexa:  deferred  IUD Removal    Performed by: PRISCA Moreno, APRN-CNP  Authorized by: PRISCA Moreno APRN-CNP    Procedure: IUD removal    Consent obtained by patient, parent, or legal power of  - including discussion of procedure risks and benefits, patient questions answered, and patient education provided: yes    Reason for removal: patient request    Strings visualized: yes    Cervix cleaned with: iodopovidone    Tenaculum applied to cervix: no    IUD grasped by forceps: yes    Performed with ultrasound guidance: no    IUD removed: yes    Date/Time of Removal:  2025 8:11 AM  Removed with no complications: no    IUD intact: yes    Cervix manually dilated: no         Skin: Normal skin color and pigmentation "     Janeen was seen today for follow-up.  Diagnoses and all orders for this visit:  Menorrhagia with regular cycle (Primary)  -     drospirenone, contraceptive, 4 mg (28) tablet; Take 1 tablet by mouth once daily.  -     CBC; Future  -     TSH with reflex to Free T4 if abnormal; Future  Intractable migraine with aura without status migrainosus  -     drospirenone, contraceptive, 4 mg (28) tablet; Take 1 tablet by mouth once daily.  Encounter for IUD removal  Other orders  -     IUD Removal       Plan:  Continue IUD device  String self check monthly recommended  Annual exam encouraged for maintenance of health and IUD string check by a provider  Pt verbalized understanding of the plan    Ongoing menorrhagia  Failed IUD  Trial of POP  Follow up with   For surgical consult for menorrhagia.   Pt's sister being evaluated for uterine cancer.     Karina Thomason, APRN-BENM, APRN-CNP

## 2025-01-14 ENCOUNTER — APPOINTMENT (OUTPATIENT)
Dept: OBSTETRICS AND GYNECOLOGY | Facility: CLINIC | Age: 30
End: 2025-01-14
Payer: COMMERCIAL

## 2025-01-14 ENCOUNTER — LAB (OUTPATIENT)
Dept: LAB | Facility: LAB | Age: 30
End: 2025-01-14
Payer: COMMERCIAL

## 2025-01-14 VITALS
BODY MASS INDEX: 32.32 KG/M2 | WEIGHT: 194 LBS | DIASTOLIC BLOOD PRESSURE: 86 MMHG | SYSTOLIC BLOOD PRESSURE: 122 MMHG | HEIGHT: 65 IN

## 2025-01-14 DIAGNOSIS — N92.0 MENORRHAGIA WITH REGULAR CYCLE: ICD-10-CM

## 2025-01-14 DIAGNOSIS — Z30.432 ENCOUNTER FOR IUD REMOVAL: ICD-10-CM

## 2025-01-14 DIAGNOSIS — G43.119 INTRACTABLE MIGRAINE WITH AURA WITHOUT STATUS MIGRAINOSUS: ICD-10-CM

## 2025-01-14 DIAGNOSIS — N92.0 MENORRHAGIA WITH REGULAR CYCLE: Primary | ICD-10-CM

## 2025-01-14 LAB
ERYTHROCYTE [DISTWIDTH] IN BLOOD BY AUTOMATED COUNT: 13.6 % (ref 11.5–14.5)
HCT VFR BLD AUTO: 45.7 % (ref 36–46)
HGB BLD-MCNC: 15.1 G/DL (ref 12–16)
MCH RBC QN AUTO: 29.2 PG (ref 26–34)
MCHC RBC AUTO-ENTMCNC: 33 G/DL (ref 32–36)
MCV RBC AUTO: 88 FL (ref 80–100)
NRBC BLD-RTO: 0 /100 WBCS (ref 0–0)
PLATELET # BLD AUTO: 295 X10*3/UL (ref 150–450)
RBC # BLD AUTO: 5.17 X10*6/UL (ref 4–5.2)
TSH SERPL-ACNC: 0.64 MIU/L (ref 0.44–3.98)
WBC # BLD AUTO: 7.4 X10*3/UL (ref 4.4–11.3)

## 2025-01-14 PROCEDURE — 85027 COMPLETE CBC AUTOMATED: CPT

## 2025-01-14 PROCEDURE — 99213 OFFICE O/P EST LOW 20 MIN: CPT | Performed by: NURSE PRACTITIONER

## 2025-01-14 PROCEDURE — 84443 ASSAY THYROID STIM HORMONE: CPT

## 2025-01-14 PROCEDURE — 1036F TOBACCO NON-USER: CPT | Performed by: NURSE PRACTITIONER

## 2025-01-14 PROCEDURE — 58301 REMOVE INTRAUTERINE DEVICE: CPT | Performed by: NURSE PRACTITIONER

## 2025-01-14 PROCEDURE — 3008F BODY MASS INDEX DOCD: CPT | Performed by: NURSE PRACTITIONER

## 2025-03-10 ENCOUNTER — TELEPHONE (OUTPATIENT)
Facility: CLINIC | Age: 30
End: 2025-03-10

## 2025-03-10 ENCOUNTER — APPOINTMENT (OUTPATIENT)
Dept: NEUROLOGY | Facility: CLINIC | Age: 30
End: 2025-03-10
Payer: COMMERCIAL

## 2025-03-10 VITALS
DIASTOLIC BLOOD PRESSURE: 88 MMHG | SYSTOLIC BLOOD PRESSURE: 118 MMHG | TEMPERATURE: 97 F | HEART RATE: 93 BPM | HEIGHT: 65 IN | WEIGHT: 196 LBS | BODY MASS INDEX: 32.65 KG/M2

## 2025-03-10 DIAGNOSIS — G43.719 INTRACTABLE CHRONIC MIGRAINE WITHOUT AURA AND WITHOUT STATUS MIGRAINOSUS: Primary | ICD-10-CM

## 2025-03-10 DIAGNOSIS — F32.2 CURRENT SEVERE EPISODE OF MAJOR DEPRESSIVE DISORDER WITHOUT PSYCHOTIC FEATURES WITHOUT PRIOR EPISODE (MULTI): ICD-10-CM

## 2025-03-10 PROCEDURE — 3008F BODY MASS INDEX DOCD: CPT | Performed by: PSYCHIATRY & NEUROLOGY

## 2025-03-10 PROCEDURE — 1036F TOBACCO NON-USER: CPT | Performed by: PSYCHIATRY & NEUROLOGY

## 2025-03-10 PROCEDURE — 99214 OFFICE O/P EST MOD 30 MIN: CPT | Performed by: PSYCHIATRY & NEUROLOGY

## 2025-03-10 PROCEDURE — G2211 COMPLEX E/M VISIT ADD ON: HCPCS | Performed by: PSYCHIATRY & NEUROLOGY

## 2025-03-10 PROCEDURE — G8433 SCR FOR DEP NOT CPT DOC RSN: HCPCS | Performed by: PSYCHIATRY & NEUROLOGY

## 2025-03-10 RX ORDER — ATOGEPANT 60 MG/1
60 TABLET ORAL DAILY
Qty: 30 TABLET | Refills: 11 | Status: SHIPPED | OUTPATIENT
Start: 2025-03-10

## 2025-03-10 ASSESSMENT — PAIN SCALES - GENERAL: PAINLEVEL_OUTOF10: 0-NO PAIN

## 2025-03-10 ASSESSMENT — ENCOUNTER SYMPTOMS
HALLUCINATIONS: 0
FEVER: 0
OCCASIONAL FEELINGS OF UNSTEADINESS: 0
EYE PAIN: 0
HEADACHES: 0
DEPRESSION: 0
LOSS OF SENSATION IN FEET: 0

## 2025-03-10 NOTE — PROGRESS NOTES
Subjective     Janeen Quiroz is a 29 y.o. year old female here for follow-up of migraines.     Last visit was September.  Has couple migraines per month.   She has more stress induced headaches. Her anxiety is worsened.   Ajovy injections caused bubbly redness so it was switched to Qulipta 60mg daily.  Less intense headaches, happens every other 1-2 weeks.  Take Ubrelvy and it goes away. She finds these help her migraines a lot.   Same type of headaches, starts in the back of neck and radiates up to the neck.   She drinks water.   She gets 12 tabs of Ubrelvy per month.    Ubrelvy has helped the migraine by stopping her migraine when she gets them which she is very happy about.  MRI brain 1-10-22 showed tonsillar ptosis. otherwise unremarkable.  EMG 1-5-22- WNL     Onset of headaches: ever since she remembers  Frequency of headaches (days per month): 7 days/ month  Duration of headaches (average): few hours   Severity of headaches (out of 10): 6-7 /10  Aura: visual changes/ colorful floaters. things go black. vision changes for 20 minutes.  Tends to start in her neck.   Nausea/vomiting: +  Photophobia: +  Phonophobia: +     Location: occipital area, and frontal . pressure like.      stress triggers HAs. hormonal changes as well trigger HAs.      Preventative medications:  Zonisamide - did not help   Topamax- not effective/ caused tingling.   Propranolol 60mg ER- currently on it. BP are slightly low. dizzy.   b complex   Ajovy- currently taking now.      Abortive medications:  Rizatriptan- jaw went numb  Sumatriptan- caused tiredness/ nausea.   Nurtec- mild benefit   Zofran  Fiorecet  Ubrelvy - helping her alot     SH: has 2 children.  Works as a patient coordinator.      Personally reviewed with patient- accurate for below - Past Medical, Surgical and Family and Social histories    Last visit was February.  Ajovy has helped her a lot.  Review of Systems   Constitutional:  Negative for fever.   HENT:  Negative  for ear pain.    Eyes:  Negative for pain.   Neurological:  Negative for syncope and headaches.        Headache+    Psychiatric/Behavioral:  Negative for hallucinations and self-injury.    All other systems reviewed and are negative.      Patient Active Problem List   Diagnosis    Bilateral numbness and tingling of arms and legs    Current severe episode of major depressive disorder without psychotic features without prior episode (Multi)    Generalized anxiety disorder    GERD (gastroesophageal reflux disease)    Hearing loss    Heat intolerance    Hirsutism    Hypermobility syndrome    Insomnia    Intractable chronic migraine without aura and without status migrainosus    Intractable migraine with aura without status migrainosus    Migraine    Missed menses    Pelvic pain in female    Right knee pain    Severe anxiety    Vaginal discharge    Vision loss    Vitamin B12 deficiency    Weight gain    Worsening headaches    Class 1 obesity with alveolar hypoventilation and body mass index (BMI) of 32.0 to 32.9 in adult    Family history of breast cancer     Past Medical History:   Diagnosis Date    16 weeks gestation of pregnancy ([facility]) 2020    16 weeks gestation of pregnancy    Acute cystitis without hematuria 2020    Acute cystitis without hematuria    Acute vaginitis 2020    Bacterial vaginosis    Encounter for insertion of intrauterine contraceptive device 2020    Encounter for insertion of intrauterine contraceptive device (IUD)    Encounter for other specified  screening 2020    Encounter for fetal anatomic survey    Encounter for routine checking of intrauterine contraceptive device 10/22/2020    Encounter for routine checking of intrauterine contraceptive device (IUD)    Encounter for routine postpartum follow-up 2020    Postpartum care following vaginal delivery    Gestational diabetes mellitus in childbirth, unspecified control ([facility]) 10/28/2020     Gestational diabetes mellitus (GDM), delivered    Impacted cerumen, right ear 01/11/2021    Impacted cerumen of right ear    Less than 8 weeks gestation of pregnancy ([facility]) 11/14/2019    Less than 8 weeks gestation of pregnancy    Other conditions influencing health status 03/05/2020    History of pregnancy    Other sites of candidiasis 07/29/2020    Candidiasis of breast    Pain in right ankle and joints of right foot 08/10/2018    Right ankle pain    Personal history of gestational diabetes     History of gestational diabetes mellitus (GDM)    Personal history of other diseases of the female genital tract 08/18/2020    History of amenorrhea    Personal history of other diseases of the musculoskeletal system and connective tissue 10/28/2020    History of low back pain    Personal history of other diseases of the nervous system and sense organs 04/16/2017    History of otitis media    Personal history of other diseases of the nervous system and sense organs 01/14/2021    History of ear pain    Personal history of other diseases of the nervous system and sense organs 04/02/2021    History of acute otitis media    Personal history of other diseases of the respiratory system 04/11/2017    History of acute pharyngitis    Personal history of other diseases of the respiratory system     History of sore throat    Personal history of other diseases of the respiratory system 04/16/2017    History of acute sinusitis    Personal history of other specified conditions 03/05/2020    History of nausea and vomiting    Postpartum depression 08/25/2020    Mild postpartum depression    Spotting complicating pregnancy, first trimester ([facility]) 12/05/2019    Spotting during pregnancy in first trimester    Sprain of unspecified ligament of right ankle, initial encounter 08/10/2018    Right ankle sprain     Past Surgical History:   Procedure Laterality Date    OTHER SURGICAL HISTORY  11/14/2019    No history of surgery     Social  History     Tobacco Use    Smoking status: Never     Passive exposure: Never    Smokeless tobacco: Never   Substance Use Topics    Alcohol use: Not Currently     Comment: 1-2 drinks per year     family history includes Diabetes in her father; Hyperlipidemia in her father; aunt with kidney disease and possible colon cancer in an other family member.    Current Outpatient Medications:     acetaminophen (Tylenol) 325 mg tablet, Take 3 tablets (975 mg) by mouth every 6 hours if needed for mild pain (1 - 3)., Disp: , Rfl:     atogepant (Qulipta) 60 mg tablet tablet, Take 1 tablet (60 mg) by mouth once daily., Disp: 30 tablet, Rfl: 3    cetirizine (ZyrTEC) 10 mg tablet, Take 1 tablet (10 mg) by mouth once daily., Disp: , Rfl:     drospirenone, contraceptive, 4 mg (28) tablet, Take 1 tablet by mouth once daily., Disp: 28 tablet, Rfl: 11    DULoxetine (Cymbalta) 60 mg DR capsule, take 1 capsule by mouth once a day for 30 days, Disp: , Rfl:     ibuprofen 600 mg tablet, Take 1 tablet (600 mg) by mouth every 6 hours if needed for moderate pain (4 - 6)., Disp: , Rfl:     Linzess 72 mcg capsule, , Disp: , Rfl:     LORazepam (Ativan) 0.5 mg tablet, Take by mouth., Disp: , Rfl:     meclizine (Antivert) 25 mg tablet, Take 1 tablet (25 mg) by mouth every 8 hours if needed for dizziness., Disp: , Rfl:     tiZANidine (Zanaflex) 2 mg tablet, TAKE ONE TABLET BY MOUTH ONCE A DAY AT BEDTIME AS NEEDED FOR 10 DAYS, Disp: , Rfl:     ubrogepant (Ubrelvy) 100 mg tablet tablet, Take 1 tab as needed for migraine onset, Disp: 10 tablet, Rfl: 11  Allergies   Allergen Reactions    Topiramate Other     Caused hand and feet numbness     There were no vitals taken for this visit.  Neurological Exam/Physical Exam:    Constitutional: General appearance: no acute distress. Pleasant.   Auscultation of Heart: Regular rate and rhythm, no murmurs, normal S1 and S2.   Carotid Arteries: no bruits  Peripheral Vascular Exam: No swelling, edema or tenderness to  palpation in extremities  Mental status: no distress, alert, interactive and cooperative. Affect is appropriate.   Orientation: oriented to person, oriented to place and oriented to time.   Attention: normal attention /concentration.   Language: normal comprehension and naming.   Eyes: The ophthalmoscopic examination was normal.   Cranial nerve II: Visual fields full to confrontation.   Cranial nerves III, IV, and VI: Pupils round, equally reactive to light; EOMs intact. No nystagmus.   Cranial Nerve V: Facial sensation intact to LT bilaterally.   Cranial nerve VII: no facial droop  Cranial nerve VIII: Hearing is intact  Cranial nerves IX and X: Palate elevates symmetrically.   Cranial nerve XI: Shoulder shrug intact.   Cranial nerve XII: Tongue is midline.  Motor:  Muscle bulk was normal in both upper and lower extremities.    No atrophy.   Strength is normal   Deep Tendon Reflexes: left biceps 2+ , right biceps 2+, left triceps 2+, right triceps 2+, left brachioradialis 2+, right brachioradialis 2+, left patella 2+, right patella 2+, left ankle jerk 2+, right ankle jerk 2+   Plantar Reflex: Toes downgoing to plantar stimulation on the left. Toes downgoing to plantar stimulation on the right.   Sensory Exam: Normal to light touch.   Coordination:  no limb dystaxia   Gait:  cautious.         Labs:  CBC:   Lab Results   Component Value Date    WBC 7.4 01/14/2025    HGB 15.1 01/14/2025    HCT 45.7 01/14/2025     01/14/2025     BMP:   Lab Results   Component Value Date     10/18/2021    K 3.9 10/18/2021     10/18/2021    CO2 28 10/18/2021    BUN 12 10/18/2021    CREATININE 0.69 10/18/2021    CALCIUM 9.0 10/18/2021     LFT:   Lab Results   Component Value Date    ALKPHOS 88 10/18/2021    BILITOT 0.6 10/18/2021    PROT 7.5 10/18/2021    ALBUMIN 4.2 10/18/2021    ALT 15 10/18/2021    AST 18 10/18/2021         Assessment/Plan   Problem List Items Addressed This Visit    None    Migraines are much more  stable on her current regimen.   Qulipta helping a lot.  Ubrelvy helping for abortive dramatically.

## 2025-03-10 NOTE — PATIENT INSTRUCTIONS
"It was a pleasure seeing you today.     Please make a follow up appointment in 5-6 months.  You may also schedule a phone or virtual visit sooner on a Friday morning with me as needed before the next clinic appointment.     For any urgent issues or needing to speak to a medical assistant please call [phone], option 6 during our office hours Monday-Friday 8am-4pm, and leave a voicemail with your concern.  My office will try to reach back you as soon as possible within 24 (business) hours.  If you have an emergency please call 911 or visit a local urgent care or nearest emergency room.      Please understand that Dweho is a useful communication tool for simple \"normal\" results or a refill request but I would not recommend using this tool for emergent or urgent issues or for conversations with me.  I am happy to ask my staff to rearrange a follow up visit or a virtual visit sooner than requested if appropriate for your care.    "

## 2025-03-11 ENCOUNTER — SPECIALTY PHARMACY (OUTPATIENT)
Dept: PHARMACY | Facility: CLINIC | Age: 30
End: 2025-03-11

## 2025-03-17 ENCOUNTER — APPOINTMENT (OUTPATIENT)
Dept: OBSTETRICS AND GYNECOLOGY | Facility: CLINIC | Age: 30
End: 2025-03-17
Payer: COMMERCIAL

## 2025-03-24 ENCOUNTER — SPECIALTY PHARMACY (OUTPATIENT)
Dept: PHARMACY | Facility: CLINIC | Age: 30
End: 2025-03-24

## 2025-03-31 ENCOUNTER — APPOINTMENT (OUTPATIENT)
Dept: OBSTETRICS AND GYNECOLOGY | Facility: CLINIC | Age: 30
End: 2025-03-31
Payer: COMMERCIAL

## 2025-04-17 ENCOUNTER — PATIENT MESSAGE (OUTPATIENT)
Dept: OBSTETRICS AND GYNECOLOGY | Facility: CLINIC | Age: 30
End: 2025-04-17
Payer: COMMERCIAL

## 2025-04-17 DIAGNOSIS — N92.0 MENORRHAGIA WITH REGULAR CYCLE: ICD-10-CM

## 2025-04-17 DIAGNOSIS — G43.119 INTRACTABLE MIGRAINE WITH AURA WITHOUT STATUS MIGRAINOSUS: ICD-10-CM

## 2025-06-19 DIAGNOSIS — G43.719 INTRACTABLE CHRONIC MIGRAINE WITHOUT AURA AND WITHOUT STATUS MIGRAINOSUS: ICD-10-CM

## 2025-06-19 RX ORDER — ATOGEPANT 60 MG/1
60 TABLET ORAL DAILY
Qty: 30 TABLET | Refills: 11 | Status: SHIPPED | OUTPATIENT
Start: 2025-06-19

## 2025-06-24 ENCOUNTER — SPECIALTY PHARMACY (OUTPATIENT)
Dept: PHARMACY | Facility: CLINIC | Age: 30
End: 2025-06-24

## 2025-10-17 ENCOUNTER — APPOINTMENT (OUTPATIENT)
Dept: NEUROLOGY | Facility: CLINIC | Age: 30
End: 2025-10-17
Payer: COMMERCIAL